# Patient Record
Sex: FEMALE | Race: BLACK OR AFRICAN AMERICAN | NOT HISPANIC OR LATINO | Employment: UNEMPLOYED | URBAN - METROPOLITAN AREA
[De-identification: names, ages, dates, MRNs, and addresses within clinical notes are randomized per-mention and may not be internally consistent; named-entity substitution may affect disease eponyms.]

---
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
## 2024-05-22 ENCOUNTER — HOSPITAL ENCOUNTER (EMERGENCY)
Facility: HOSPITAL | Age: 19
Discharge: HOME | End: 2024-05-22
Attending: EMERGENCY MEDICINE
Payer: COMMERCIAL

## 2024-05-22 VITALS
SYSTOLIC BLOOD PRESSURE: 137 MMHG | WEIGHT: 209 LBS | RESPIRATION RATE: 20 BRPM | DIASTOLIC BLOOD PRESSURE: 80 MMHG | TEMPERATURE: 99 F | HEART RATE: 74 BPM | BODY MASS INDEX: 38.23 KG/M2 | OXYGEN SATURATION: 100 %

## 2024-05-22 DIAGNOSIS — M25.569 KNEE PAIN: ICD-10-CM

## 2024-05-22 DIAGNOSIS — M25.562 ACUTE PAIN OF LEFT KNEE: Primary | ICD-10-CM

## 2024-05-22 LAB
B-HCG UR QL: NEGATIVE
CTP QC/QA: YES

## 2024-05-22 PROCEDURE — 25000003 PHARM REV CODE 250: Performed by: NURSE PRACTITIONER

## 2024-05-22 PROCEDURE — 81025 URINE PREGNANCY TEST: CPT | Performed by: NURSE PRACTITIONER

## 2024-05-22 PROCEDURE — 99283 EMERGENCY DEPT VISIT LOW MDM: CPT | Mod: 25

## 2024-05-22 RX ORDER — MELOXICAM 7.5 MG/1
15 TABLET ORAL DAILY
Status: DISCONTINUED | OUTPATIENT
Start: 2024-05-22 | End: 2024-05-22 | Stop reason: HOSPADM

## 2024-05-22 RX ORDER — CYCLOBENZAPRINE HCL 10 MG
10 TABLET ORAL 3 TIMES DAILY PRN
Qty: 15 TABLET | Refills: 0 | Status: SHIPPED | OUTPATIENT
Start: 2024-05-22 | End: 2024-05-27

## 2024-05-22 RX ADMIN — MELOXICAM 15 MG: 7.5 TABLET ORAL at 12:05

## 2024-05-22 NOTE — Clinical Note
"Essence "Deborah" Osito was seen and treated in our emergency department on 5/22/2024.  She may return to work on 05/27/2024.       If you have any questions or concerns, please don't hesitate to call.      Anisha Pierce NP"

## 2024-05-22 NOTE — DISCHARGE INSTRUCTIONS
CONTINUE TAKING MELOXICAM AND THE MEDROL DOSEPAK THAT WAS PRESCRIBED TO YOU BY THE CLINIC.    USE AN ACE WRAP OVER THE AFFECTED KNEE TO HELP SUPPORT THE JOINT.    CONTINUE TO REST THE JOINT AND ELEVATED.  YOU CAN APPLY ICE OVER THE AFFECTED AREA TO HELP REDUCE SWELLING.    FOLLOW-UP WITH ORTHOPEDICS FOR FURTHER EVALUATION OF YOUR KNEE PAIN IF IT DOES NOT IMPROVE.    RETURN TO THE EMERGENCY ROOM FOR ANY NEW OR WORSENING SYMPTOMS OR CONCERNS.

## 2024-05-22 NOTE — Clinical Note
"Essence "Deborah" Osito was seen and treated in our emergency department on 5/22/2024.  She may return to work on 05/23/2024.       If you have any questions or concerns, please don't hesitate to call.      Anisha Pierce NP"

## 2024-05-22 NOTE — ED PROVIDER NOTES
Encounter Date: 5/22/2024    SCRIBE #1 NOTE: ILeigh, am scribing for, and in the presence of,  Anisha Pierce NP. Other sections scribed: HPI, ROS.       History     Chief Complaint   Patient presents with    Knee Pain     Pt reports to Ed for left knee pain since Saturday. Pt denies injury to knee. Pt ambulatory in triage.     CC: Knee pain    HPI: History is obtained from independent historian. This is a 19 y.o. F who has no pertinent PMHx who presents to the ED for emergent evaluation of acute atraumatic left knee pain that began 4 days ago. Pt has associated left knee swelling. Pt's knee pain is worse with standing and bearing weight onto the left knee when walking. Pt attempted elevating, and applying cold compressors to the left knee with minimal relief. The knee pain and swelling has not completely resolved. Pt reports left ankle pain during initial onset of the knee pain, but the left ankle pain has resolved. Pt was evaluated for the left knee pain and swelling at a clinic yesterday. She states that the provider did not fully examine the knee. She states that she was prescribed a Medrol dose pack, an d Mobic at that visit. She states that she started taking Medrol pack without relief. She has not been able to  the Mobic from the pharmacy yet due to the medication not being ready of as of yet. Pt denies fever, chills, nausea, calf pain, body aches, redness of skin, or warmth of skin.    The history is provided by the patient. No  was used.     Review of patient's allergies indicates:   Allergen Reactions    Amoxicillin Hives     Past Medical History:   Diagnosis Date    Urticaria     when amoxicillin     Past Surgical History:   Procedure Laterality Date    ADENOIDECTOMY      TONSILLECTOMY      TYMPANOSTOMY TUBE PLACEMENT       Family History   Problem Relation Name Age of Onset    Allergies Father      Allergies Brother      Allergies Cousin       Social History     Tobacco  Use    Smoking status: Never   Substance Use Topics    Alcohol use: No     Review of Systems   Constitutional:  Negative for chills and fever.   HENT:  Negative for sore throat.    Respiratory:  Negative for shortness of breath.    Cardiovascular:  Negative for chest pain.   Gastrointestinal:  Negative for abdominal pain, diarrhea, nausea and vomiting.   Genitourinary:  Negative for dysuria.   Musculoskeletal:  Positive for arthralgias (left knee) and joint swelling (left knee). Negative for back pain and myalgias.        (-) Calf pain   Skin:  Negative for color change and rash.        (-) Warmth of skin   Neurological:  Negative for weakness.   Hematological:  Does not bruise/bleed easily.       Physical Exam     Initial Vitals [05/22/24 1112]   BP Pulse Resp Temp SpO2   (!) 141/89 92 18 98.6 °F (37 °C) 97 %      MAP       --         Physical Exam    Nursing note and vitals reviewed.  Constitutional: Vital signs are normal. She appears well-developed and well-nourished. She is not diaphoretic. She is active and cooperative.  Non-toxic appearance. She does not have a sickly appearance. She does not appear ill.   Eyes: Conjunctivae and EOM are normal. Pupils are equal, round, and reactive to light.   Neck: Neck supple.   Normal range of motion.  Pulmonary/Chest: No respiratory distress.   Musculoskeletal:         General: Normal range of motion.      Cervical back: Normal range of motion and neck supple.      Left knee: Swelling (trace, diffuse) present. No deformity, effusion, erythema, ecchymosis or bony tenderness. Normal range of motion. No LCL laxity, MCL laxity, ACL laxity or PCL laxity.Normal alignment. Normal pulse.      Instability Tests: Anterior drawer test negative.      Comments: FROM of left knee without pain. DP/PT +2 bilaterally.     Neurological: She is alert and oriented to person, place, and time. She has normal strength.   Skin: Skin is warm and dry.   Psychiatric: She has a normal mood and  affect.         ED Course   Procedures  Labs Reviewed   POCT URINE PREGNANCY          Imaging Results              X-Ray Knee 3 View Left (Final result)  Result time 05/22/24 12:53:20      Final result by Sb Leon III, MD (05/22/24 12:53:20)                   Narrative:    EXAMINATION:  XR KNEE 3 VIEW LEFT    CLINICAL HISTORY:  Pain in unspecified knee    FINDINGS:  Knee three views left.    No fracture dislocation bone destruction or OCD seen.      Electronically signed by: Sb Leon MD  Date:    05/22/2024  Time:    12:53                                  X-Rays:   Independently Interpreted Readings:   Other Readings:  XR left knee with no acute fractures or dislocations    Medications   meloxicam tablet 15 mg (15 mg Oral Given 5/22/24 1241)     Medical Decision Making  This is an urgent evaluation of a 19-year-old female that presents to emergency room complaining of atraumatic left knee pain that started several days ago.  Patient reports left ankle pain that spontaneously resolved, though she still has swelling and pain with weight-bearing to the left knee.  There is no pain with passive range of motion of the knee, erythema, warmth, vascular deficits. There is no focal tenderness.  Pt localizes pain to the left lateral knee, along the ligament. Possibly a sprain or strain vs OA.  The patient's xrays show no signs of fracture, dislocation, or subluxation.  The patient could have a ligamentous injury, but the knee doesn't appear to be unstable.  I considered but doubt RA, septic joint, gout, DVT.  I see no indication for further workup.  Will give ortho referral for follow up.  Rx muscle relaxant. Pt already has mobic and medrol dosepack.  Return precautions.    Amount and/or Complexity of Data Reviewed  Independent Historian:      Details: See HPI  Labs: ordered.  Radiology: ordered.    Risk  Prescription drug management.            Scribe Attestation:   Scribe #1: I performed the above scribed  service and the documentation accurately describes the services I performed. I attest to the accuracy of the note.                           I, Anisha Pierce, personally performed the services described in this documentation. All medical record entries made by the scribe were at my direction and in my presence. I have reviewed the chart and agree that the record reflects my personal performance and is accurate and complete.      Clinical Impression:  Final diagnoses:  [M25.569] Knee pain  [M25.562] Acute pain of left knee (Primary)          ED Disposition Condition    Discharge Stable          ED Prescriptions       Medication Sig Dispense Start Date End Date Auth. Provider    cyclobenzaprine (FLEXERIL) 10 MG tablet Take 1 tablet (10 mg total) by mouth 3 (three) times daily as needed (knee pain). May cause drowsiness 15 tablet 5/22/2024 5/27/2024 Anisha Pierce NP          Follow-up Information       Follow up With Specialties Details Why Contact Info    Fabienne Gamble MD Orthopedic Surgery  ORTHOPEDICS [address] [facility] [address]  [phone]      Britton Escobar MD Orthopedic Surgery Call in 1 day To schedule an appointment for followup with Orthopedics [address]  [place] [address]  [phone]      [facility] - Emergency Dept Emergency Medicine  As needed, If symptoms worsen [address] [facility] [place] [address] [phone]             Anisha Pierce NP  05/22/24 1555

## 2024-05-23 ENCOUNTER — OFFICE VISIT (OUTPATIENT)
Dept: SPORTS MEDICINE | Facility: CLINIC | Age: 19
End: 2024-05-23
Payer: COMMERCIAL

## 2024-05-23 VITALS
BODY MASS INDEX: 38.28 KG/M2 | SYSTOLIC BLOOD PRESSURE: 132 MMHG | HEART RATE: 96 BPM | HEIGHT: 62 IN | DIASTOLIC BLOOD PRESSURE: 80 MMHG | WEIGHT: 208 LBS

## 2024-05-23 DIAGNOSIS — M25.562 ACUTE PAIN OF LEFT KNEE: ICD-10-CM

## 2024-05-23 PROCEDURE — 99999 PR PBB SHADOW E&M-EST. PATIENT-LVL IV: CPT | Mod: PBBFAC,,, | Performed by: ORTHOPAEDIC SURGERY

## 2024-05-23 PROCEDURE — 1159F MED LIST DOCD IN RCRD: CPT | Mod: CPTII,S$GLB,, | Performed by: ORTHOPAEDIC SURGERY

## 2024-05-23 PROCEDURE — 3008F BODY MASS INDEX DOCD: CPT | Mod: CPTII,S$GLB,, | Performed by: ORTHOPAEDIC SURGERY

## 2024-05-23 PROCEDURE — 99214 OFFICE O/P EST MOD 30 MIN: CPT | Mod: S$GLB,,, | Performed by: ORTHOPAEDIC SURGERY

## 2024-05-23 PROCEDURE — 3079F DIAST BP 80-89 MM HG: CPT | Mod: CPTII,S$GLB,, | Performed by: ORTHOPAEDIC SURGERY

## 2024-05-23 PROCEDURE — 3075F SYST BP GE 130 - 139MM HG: CPT | Mod: CPTII,S$GLB,, | Performed by: ORTHOPAEDIC SURGERY

## 2024-05-23 NOTE — PROGRESS NOTES
CC: Left knee pain    19 y.o. Female with a 6 day history of atraumatic left knee pain.  Patient reports pain increased on Saturday with no injury or traumatic event. She went to the ED yesterday due to the pain being so high. She received xrays and was prescribed muscle relaxor and mobic. She reports pain has decreased since this visit. She was sent to me for follow up care.     - mechanical symptoms, - instability    Is affecting ADLs.  Pain is 4/10 at it's worst.    REVIEW OF SYSTEMS:  Constitution: Negative. Negative for chills, fever and night sweats.   HENT: Negative for congestion and headaches.    Eyes: Negative for blurred vision, left vision loss and right vision loss.   Cardiovascular: Negative for chest pain and syncope.   Respiratory: Negative for cough and shortness of breath.    Endocrine: Negative for polydipsia, polyphagia and polyuria.   Hematologic/Lymphatic: Negative for bleeding problem. Does not bruise/bleed easily.   Skin: Negative for dry skin, itching and rash.   Musculoskeletal: Negative for falls. Positive for left knee pain and  muscle weakness.   Gastrointestinal: Negative for abdominal pain and bowel incontinence.   Genitourinary: Negative for bladder incontinence and nocturia.   Neurological: Negative for disturbances in coordination, loss of balance and seizures.   Psychiatric/Behavioral: Negative for depression. The patient does not have insomnia.    Allergic/Immunologic: Negative for hives and persistent infections.     PAST MEDICAL HISTORY:    Past Medical History:   Diagnosis Date    Urticaria     when amoxicillin       PAST SURGICAL HISTORY:   Past Surgical History:   Procedure Laterality Date    ADENOIDECTOMY      TONSILLECTOMY      TYMPANOSTOMY TUBE PLACEMENT         FAMILY HISTORY:   Family History   Problem Relation Name Age of Onset    Allergies Father      Allergies Brother      Allergies Cousin         SOCIAL HISTORY:   Social History     Socioeconomic History    Marital  status: Single   Tobacco Use    Smoking status: Never   Substance and Sexual Activity    Alcohol use: No    Sexual activity: Never     Social Determinants of Health     Financial Resource Strain: Low Risk  (5/22/2024)    Overall Financial Resource Strain (CARDIA)     Difficulty of Paying Living Expenses: Not hard at all   Food Insecurity: No Food Insecurity (5/22/2024)    Hunger Vital Sign     Worried About Running Out of Food in the Last Year: Never true     Ran Out of Food in the Last Year: Never true   Transportation Needs: No Transportation Needs (5/20/2024)    Received from [facility]    PRAPARE - Transportation     Lack of Transportation (Medical): No     Lack of Transportation (Non-Medical): No   Physical Activity: Sufficiently Active (5/22/2024)    Exercise Vital Sign     Days of Exercise per Week: 5 days     Minutes of Exercise per Session: 30 min   Stress: No Stress Concern Present (5/22/2024)    [employer] - Occupational Stress Questionnaire     Feeling of Stress : Only a little   Recent Concern: Stress - Stress Concern Present (5/20/2024)    Received from [facility]    [employer] - Occupational Stress Questionnaire     Feeling of Stress : To some extent   Housing Stability: Unknown (5/22/2024)    Housing Stability Vital Sign     Unable to Pay for Housing in the Last Year: No       MEDICATIONS:     Current Outpatient Medications:     cetirizine (ZYRTEC) 10 MG tablet, Take 10 mg by mouth once daily., Disp: , Rfl:     cyclobenzaprine (FLEXERIL) 10 MG tablet, Take 1 tablet (10 mg total) by mouth 3 (three) times daily as needed (knee pain). May cause drowsiness, Disp: 15 tablet, Rfl: 0    drospirenone-ethinyl estradioL (MIN) 3-0.02 mg per tablet, Take 1 tablet by mouth., Disp: , Rfl:     ibuprofen (ADVIL,MOTRIN) 600 MG tablet, Take 1 tablet (600 mg total) by mouth every 6 (six) hours as needed for Pain (Take with food as needed for mild-to-moderate  "pain)., Disp: 20 tablet, Rfl: 0    pediatric multivitamin chewable tablet, Take 1 tablet by mouth once daily., Disp: , Rfl:     PROAIR HFA 90 mcg/actuation HFAA, , Disp: , Rfl:   No current facility-administered medications for this visit.    ALLERGIES:   Review of patient's allergies indicates:   Allergen Reactions    Amoxicillin Hives       VITAL SIGNS:   Ht 5' 2" (1.575 m)   Wt 94.3 kg (208 lb)   BMI 38.04 kg/m²      PHYSICAL EXAMINATION  General:  The patient is alert and oriented x 3.  Mood is pleasant.  Observation of ears, eyes and nose reveal no gross abnormalities.  No labored breathing observed.    LEFT KNEE EXAMINATION     OBSERVATION / INSPECTION   Gait:   Nonantalgic   Alignment:  Neutral   Scars:   None   Muscle atrophy: Mild  Effusion:  None   Warmth:  None   Discoloration:   none     TENDERNESS / CREPITUS (T / C):          T / C      T / C   Patella   - / -   Lateral joint line   - / -   Peripatellar medial  +  Medial joint line    - / -   Peripatellar lateral -  Medial plica   - / -   Patellar tendon -   Popliteal fossa   - / -   Quad tendon   -   Gastrocnemius   -   Prepatellar Bursa - / -   Quadricep   -   Tibial tubercle  -  Thigh/hamstring  -   Pes anserine/HS -  Fibula    -   ITB   - / -  Tibia     -   Tib/fib joint  - / -  LCL    -     MFC   - / -   MCL: Proximal  -    LFC   - / -    Distal   -          ROM: (* = pain)  PASSIVE   ACTIVE    Left :   5 / 0 / 145   5 / 0 / 145     Right :    5 / 0 / 145   5 / 0 / 145    Patellofemoral examination:  See above noted areas of tenderness.   Patella position    Subluxation / dislocation: Centered           Sup. / Inf;   Normal   Crepitus (PF):    Absent   Patellar Mobility:       Medial-lateral:   Normal    Superior-inferior:  Normal    Inferior tilt   Normal    Patellar tendon:  Normal   Lateral tilt:    Normal   J-sign:     None   Patellofemoral grind:   No pain       MENISCAL SIGNS:     Pain on terminal extension:  -  Pain on terminal " flexion:  -  Duys maneuver:  -   Squat     -     LIGAMENT EXAMINATION:  ACL / Lachman:  normal (-1 to 2mm)    PCL-Post.  drawer: normal 0 to 2mm  MCL- Valgus:  normal 0 to 2mm  LCL- Varus:  normal 0 to 2mm  Pivot shift: normal (Equal)   Dial Test: difference c/w other side   At 30° flexion: normal (< 5°)    At 90° flexion: normal (< 5°)   Reverse Pivot Shift:   normal (Equal)     STRENGTH: (* = with pain) PAINFUL SIDE   Quadricep   5/5   Hamstrin/5    EXTREMITY NEURO-VASCULAR EXAMINATION:   Sensation:  Grossly intact to light touch all dermatomal regions.   Motor Function:  Fully intact motor function at hip, knee, foot and ankle    DTRs;  quadriceps and  achilles 2+.  No clonus and downgoing Babinski.    Vascular status:  DP and PT pulses 2+, brisk capillary refill, symmetric.     OTHER FINDINGS:    IMAGING:     X-rays including standing, weight bearing AP and flexion bilateral knees, lateral and merchant views ordered and images reviewed by me show:    No fracture, dislocation or other pathology   Medial compartment: no degenerative changes   Lateral compartment: no degenerative changes   Patellofemoral compartment: no degenerative changes       ASSESSMENT:    Left Knee  Pain, possible   1. Acute pain of left knee         PLAN:   1. 3d Knee sleeve    2. 1 - 2 sessions of PT.     3. F/u in 6 weeks.     All questions were answered, pt will contact us for questions or concerns in the interim.               Home Suture Removal Text: Patient was provided instructions on removing sutures and will remove their sutures at home.  If they have any questions or difficulties they will call the office.

## 2025-08-01 ENCOUNTER — HOSPITAL ENCOUNTER (EMERGENCY)
Facility: HOSPITAL | Age: 20
Discharge: HOME | End: 2025-08-01
Attending: EMERGENCY MEDICINE
Payer: COMMERCIAL

## 2025-08-01 VITALS
SYSTOLIC BLOOD PRESSURE: 129 MMHG | HEIGHT: 62 IN | OXYGEN SATURATION: 98 % | RESPIRATION RATE: 18 BRPM | WEIGHT: 217 LBS | DIASTOLIC BLOOD PRESSURE: 80 MMHG | HEART RATE: 93 BPM | BODY MASS INDEX: 39.93 KG/M2 | TEMPERATURE: 99 F

## 2025-08-01 DIAGNOSIS — I88.9 LYMPHADENITIS: Primary | ICD-10-CM

## 2025-08-01 LAB
B-HCG UR QL: NEGATIVE
CTP QC/QA: YES

## 2025-08-01 PROCEDURE — 99283 EMERGENCY DEPT VISIT LOW MDM: CPT

## 2025-08-01 PROCEDURE — 81025 URINE PREGNANCY TEST: CPT

## 2025-08-01 RX ORDER — NAPROXEN 500 MG/1
500 TABLET ORAL 2 TIMES DAILY WITH MEALS
Qty: 20 TABLET | Refills: 0 | Status: SHIPPED | OUTPATIENT
Start: 2025-08-01 | End: 2025-08-11

## 2025-08-07 ENCOUNTER — OFFICE VISIT (OUTPATIENT)
Dept: OTOLARYNGOLOGY | Facility: CLINIC | Age: 20
End: 2025-08-07
Payer: COMMERCIAL

## 2025-08-07 VITALS
DIASTOLIC BLOOD PRESSURE: 82 MMHG | BODY MASS INDEX: 40.81 KG/M2 | WEIGHT: 223.13 LBS | SYSTOLIC BLOOD PRESSURE: 121 MMHG

## 2025-08-07 DIAGNOSIS — S16.1XXA STRAIN OF NECK MUSCLE, INITIAL ENCOUNTER: Primary | ICD-10-CM

## 2025-08-07 PROCEDURE — 1159F MED LIST DOCD IN RCRD: CPT | Mod: CPTII,S$GLB,, | Performed by: PHYSICIAN ASSISTANT

## 2025-08-07 PROCEDURE — 99203 OFFICE O/P NEW LOW 30 MIN: CPT | Mod: S$GLB,,, | Performed by: PHYSICIAN ASSISTANT

## 2025-08-07 PROCEDURE — 1160F RVW MEDS BY RX/DR IN RCRD: CPT | Mod: CPTII,S$GLB,, | Performed by: PHYSICIAN ASSISTANT

## 2025-08-07 PROCEDURE — 3074F SYST BP LT 130 MM HG: CPT | Mod: CPTII,S$GLB,, | Performed by: PHYSICIAN ASSISTANT

## 2025-08-07 PROCEDURE — 3079F DIAST BP 80-89 MM HG: CPT | Mod: CPTII,S$GLB,, | Performed by: PHYSICIAN ASSISTANT

## 2025-08-07 PROCEDURE — 3008F BODY MASS INDEX DOCD: CPT | Mod: CPTII,S$GLB,, | Performed by: PHYSICIAN ASSISTANT

## 2025-08-07 PROCEDURE — 99999 PR PBB SHADOW E&M-EST. PATIENT-LVL III: CPT | Mod: PBBFAC,,, | Performed by: PHYSICIAN ASSISTANT

## 2025-08-07 NOTE — PROGRESS NOTES
No chief complaint on file.      History of Present Illness      Patient presents today for right-sided neck swelling and pain. She reports neck pain and swelling that began on August 1st, initially presenting with right-sided lymph node swelling. The pain location has shifted from originally more posterior to now more anterior. Pain is worse with movement of the neck. She denies any precipitating injury, unusual activity, or specific incident that might have caused the pain. She denies associated symptoms such as sore throat, difficulty swallowing, feeling of obstruction in the throat, fever, chills, voice changes, or ear pain. She reports typical allergy symptoms that are common and nothing out of the ordinary. She visited the emergency room where naproxen was prescribed, which is not providing pain relief. She previously visited urgent care where antibiotics were prescribed but she did not take these. She has been using a heated stuffed animal as a heating pad for pain management.     No PSHx head/neck  Never smoker    Chart Review  8/1/25 - Urgent care visit, c/o sore throat. Given rx for azithromycin.   8/1/25 - ED visit c/o neck pain. Given naproxen.     Review of Systems     Constitutional: Negative for fever  HENT: per HPI      Past Medical History:   Diagnosis Date    Urticaria     when amoxicillin       Past Surgical History:   Procedure Laterality Date    ADENOIDECTOMY      TONSILLECTOMY      TYMPANOSTOMY TUBE PLACEMENT         family history includes Allergies in her brother, cousin, and father.    Pt  reports that she has never smoked. She does not have any smokeless tobacco history on file. She reports that she does not drink alcohol.    Review of patient's allergies indicates:   Allergen Reactions    Amoxicillin Hives        Physical Exam    Vitals:    08/07/25 1037   BP: 121/82     Body mass index is 40.81 kg/m².    General: AOx3, NAD  Right Ear: External Auditory Canal WNL,TM w/o  masses/lesions/perforations  Left Ear:  External Auditory Canal WNL,TM w/o masses/lesions/perforations  Nose: No gross nasal septal deviation.  Inferior Turbinates WNL bilaterally.  No septal perforation.  No masses/lesions.  Oral Cavity: FOM Soft, no masses palpated.  Oral Tongue mobile.  Hard Palate WNL.  Oropharynx: BOT WNL.  No masses/lesions noted.  Tonsillar fossa without lesions.  Soft palate without masses.  Midline uvula.  Neck: No palpable lymphadenopathy at I - VI.  There is tenderness to the R SCM and the R posterior neck. No midline C spine tenderness. Normal ROM of the neck.  Face: House Brackmann I bilaterally.  Eyes: Normal extra ocular motion bilaterally.      Assessment     1. Strain of neck muscle, initial encounter          Plan    Problem List Items Addressed This Visit    None  Visit Diagnoses         Strain of neck muscle, initial encounter    -  Primary          Likely muscle strain of the neck. Pain is worse with movement, muscular tenderness to palpation. No palpable lymphadenopathy. No infectious symptoms. Recommend heating pad, continue NSAIDs, muscle creams. Can consider PT if symptoms do not improve.   All questions answered. Follow up as needed.     This note was generated with the assistance of ambient listening technology. Verbal consent was obtained by the patient and accompanying visitor(s) for the recording of patient appointment to facilitate this note. I attest to having reviewed and edited the generated note for accuracy, though some syntax or spelling errors may persist. Please contact the author of this note for any clarification.